# Patient Record
Sex: MALE
[De-identification: names, ages, dates, MRNs, and addresses within clinical notes are randomized per-mention and may not be internally consistent; named-entity substitution may affect disease eponyms.]

---

## 2018-08-12 ENCOUNTER — HOSPITAL ENCOUNTER (EMERGENCY)
Dept: HOSPITAL 5 - ED | Age: 43
Discharge: HOME | End: 2018-08-12
Payer: COMMERCIAL

## 2018-08-12 VITALS — DIASTOLIC BLOOD PRESSURE: 75 MMHG | SYSTOLIC BLOOD PRESSURE: 130 MMHG
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
## 2018-08-12 DIAGNOSIS — S61.214A: Primary | ICD-10-CM

## 2018-08-12 DIAGNOSIS — F17.200: ICD-10-CM
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
## 2018-08-12 DIAGNOSIS — I10: ICD-10-CM

## 2018-08-12 PROCEDURE — 99282 EMERGENCY DEPT VISIT SF MDM: CPT

## 2018-08-12 PROCEDURE — 90715 TDAP VACCINE 7 YRS/> IM: CPT

## 2018-08-12 PROCEDURE — 90471 IMMUNIZATION ADMIN: CPT

## 2018-08-12 NOTE — EMERGENCY DEPARTMENT REPORT
ED Laceration HPI





- HPI


Chief Complaint: Laceration/Recheck/Suture


Stated Complaint: RIGHT FINGER/NEED STITCHS


Time Seen by Provider: 08/12/18 11:17


Occurred When: Today


Location: Upper Extremity


Severity: mild


Tetanus Status: Not up to Date


Laceration Symptoms: Yes Pain, No Foreign Body Sensation, No Numbness, No 

Weakness


Other History: This is a 42-year-old male nontoxic in appearance with no signs 

of distress sensitivity ER with laceration to right ring finger that occurred 

this morning.  He stated he was at work and injured his finger with a metal 

sharp object.  Patient denies being up-to-date with tetanus.  Patient denies 

any decreased sensation or decreased range of motion.  Patient denies any fever

, chills, nausea, vomiting, chest pain, shortness of breath, headache or stiff 

neck.  Patient denies any allergies significant past medical history.  He 

stated that he used "powered minor injuries" to the lacearation.





ED Review of Systems


ROS: 


Stated complaint: RIGHT FINGER/NEED STITCHS


Other details as noted in HPI





Constitutional: denies: chills, fever


Eyes: denies: eye pain, eye discharge, vision change


ENT: denies: ear pain, throat pain


Respiratory: denies: cough, shortness of breath, wheezing


Cardiovascular: denies: chest pain, palpitations


Endocrine: no symptoms reported


Gastrointestinal: denies: abdominal pain, nausea, diarrhea


Genitourinary: denies: urgency, dysuria


Musculoskeletal: denies: back pain, joint swelling, arthralgia


Skin: denies: rash, lesions


Neurological: denies: headache, weakness, paresthesias


Psychiatric: denies: anxiety, depression


Hematological/Lymphatic: denies: easy bleeding, easy bruising





ED Past Medical Hx





- Past Medical History


Previous Medical History?: Yes


Hx Hypertension: Yes





- Surgical History


Past Surgical History?: No





- Social History


Smoking Status: Current Every Day Smoker


Substance Use Type: None





- Medications


Home Medications: 


 Home Medications











 Medication  Instructions  Recorded  Confirmed  Last Taken  Type


 


Acetaminophen/Codeine [Tylenol 1 tab PO Q6H PRN #12 tab 08/12/18  Unknown Rx





/Codeine # 3 tab]     


 


Ibuprofen [Motrin] 600 mg PO Q8H PRN #30 tablet 08/12/18  Unknown Rx


 


Sulfamethoxazole/Trimethoprim 1 each PO BID #14 tablet 08/12/18  Unknown Rx





[Bactrim DS TAB]     














Laceration Physical Exam





- Exam


General: 


Vital signs noted. No distress. Alert and acting appropriately.





GENERAL: The patient is a well-developed, well-nourished in no apparent 

distress. Patient is alert and acting appropriately for age.  Alert and 

oriented 3, no apparent distress, normal gait, atraumatic.





HEENT: Head is normocephalic and atraumatic. PERRL, Extraocular muscles are 

intact. Pupils are equal, round, and reactive to light and accommodation. Nares 

appeared normal. Mouth is well hydrated and without lesions. Mucous membranes 

are moist. Posterior pharynx clear of any exudate or lesions.  Mouth is well 

hydrated and without lesions.  Tonsils not erythematous or swollen.  Uvula 

midline.  Tongue elevated.  Mucous members are moist.  Posterior pharynx clear, 

no exudate or lesions.  Patent airways.


 


NECK: Supple. No carotid bruits. No lymphadenopathy or thyromegaly.nontender. 

No meningitic signs are noted.


 


LUNGS: Clear to auscultation. Non labor breathing. No intercostal retractions.  

Symmetrical with respiration, no wheezing, no rales, or crackles.


 


HEART: Regular rate and rhythm without murmur, rubs or gallops. No 

reproducible.  S1, S2 present, regular rate and rhythm without murmur, no rubs, 

no gallops.


 


ABDOMEN: Soft, nontender, and nondistended.  Positive bowel sounds.  No 

hepatosplenomegaly was noted. No guarding or rebound tenderness, negative 

epigastric bruit. Negative psoas sign, negative garcia sign, negative McBurneys 

sign


 


EXTREMITIES: Without any cyanosis, clubbing, rash, lesions or edema. Peripheral 

pulses intact. Capillary refill less than 2 seconds.  Full range of motion 

bilaterally.


 


NEUROLOGIC: Cranial nerves II through XII are grossly intact. Alert and 

oriented x 3. Normal gait. Symmetrical strength and sensation. Reflexes 2+ 

throughout. Cerebellar testing normal. GCS score of 15.


 


PSYCHIATRIC: Normal affect with no suicidal or homicidal ideations.





Skin: One centimeter superficial laceration to distal right ring finger.  

Neurovascular intact.  Bleeding under control.  


Wound Length (cm): 1


Laceration Location: Upper Extremity


Laceration Exam: Yes Normal Distal CMS, No Foreign Body, No Exposed Tendon, 

Vessel, or Nerve, No Tendon Injury





ED Course


 Vital Signs











  08/12/18





  10:51


 


Temperature 98.5 F


 


Pulse Rate 92 H


 


Respiratory 16





Rate 


 


Blood Pressure 131/90


 


O2 Sat by Pulse 98





Oximetry 














- Reevaluation(s)


Reevaluation #1: 





08/12/18 12:11


Patient is speaking in full sentences with no signs of distress noted.





- Laceration /Wound Repair


  ** Right Finger


Wound Location: upper extremity


Wound Length (cm): 1


Wound's Depth, Shape: superficial


Wound Explored: clean


Irrigated w/ Saline (ccs): 40


Betadine Prep?: Yes


Anesthesia: 1% Lidocaine


Volume Anesthetic (ccs): 3


Wound Debrided: minimal


Wound Repaired With: sutures


Suture Size/Type: 4:0, nylon


Number of Sutures: 3


Layer Closure?: No


Sterile Dressing Applied?: Yes


Progress: 





Patient first soaked extremity with Betadine and water prior to procedure.  

Under sterile field, I used Betadine to clean the area.  I then used 40 mL of 

normal saline to flush the area.  I then used 1% Lidocaine plain and injected 3 

mL to digital block to right proximal ring finger.  I then used a 4-0 Ethilon 

to suture the laceration.  Number of stitches 3.  I then applied a sterile 4 x 

4 with tape.  Minimal bleeding noted but is under control.  Patient tolerated 

procedure well with no signs of distress.





ED Medical Decision Making





- Medical Decision Making





This is a 42-year-old male that presents with laceration.  Patient is stable 

and was examined by me.  The laceration suturing has been performed and has 

been performed and patient tolerated well.  A sterile dressing has been 

applied.  Patient was educated on proper wound care.  Patient is discharged 

with Bactrim and Tylenol with codeine and was instructed not to operate any 

machinery while taking Tylenol with Codeine due to drowsiness.  Patient 

received a tetanus booster in the ED.  Patient was instructed to return in 10 

days for suture removal.  Patient was instructed to refer to Follow-up with a 

primary care doctor in 3-5 days or if symptoms worsen and continue return to 

emergency room as soon as possible.  At time of discharge, the patient does not 

seem toxic or ill in appearance.  No acute signs of distress noted.  Patient 

agrees to discharge treatment plan of care.  No further questions noted by the 

patient. 


Critical care attestation.: 


If time is entered above; I have spent that time in minutes in the direct care 

of this critically ill patient, excluding procedure time.








ED Disposition


Clinical Impression: 


 Laceration





Disposition: DC-01 TO HOME OR SELFCARE


Is pt being admited?: No


Does the pt Need Aspirin: No


Condition: Stable


Instructions:  Acetaminophen/Codeine (By mouth), Suture Care (ED), Laceration (

ED)


Additional Instructions: 


Follow-up with a primary care doctor in 3-5 days or if symptoms worsen and 

continue return to emergency room as soon as possible. 


Return in 10 days for suture removal.


Do not operate any machinery while taking Tylenol with codeine as this may 

cause drowsiness.


Prescriptions: 


Acetaminophen/Codeine [Tylenol /Codeine # 3 tab] 1 tab PO Q6H PRN #12 tab


 PRN Reason: Pain , Severe (7-10)


Ibuprofen [Motrin] 600 mg PO Q8H PRN #30 tablet


 PRN Reason: Pain


Sulfamethoxazole/Trimethoprim [Bactrim DS TAB] 1 each PO BID #14 tablet


Referrals: 


PRIMARY CARE,MD [Primary Care Provider] - 3-5 Days


ASHLEY GARDINER MD [Staff Physician] - 3-5 Days


[facility] [place] [Outside] - 3-5 Days


[facility] [Outside] - 3-5 Days


Forms:  Work/School Release Form(ED)